# Patient Record
(demographics unavailable — no encounter records)

---

## 2017-10-31 NOTE — C.PDOC
History Of Present Illness


33 y/o female c/o lower back pain, body aches, subjective fevers for 1 day. 

Patient unsure if she is pregnant. Denies cough, nausea, vomiting, diarrhea, or 

other associated symptoms. 


Time Seen by Provider: 10/31/17 11:09


Chief Complaint (Nursing): Back Pain


History Per: Patient


History/Exam Limitations: no limitations


Onset/Duration Of Symptoms: Days


Current Symptoms Are (Timing): Still Present


Quality Of Discomfort: "Pain"


Previous Symptoms: None


Recent travel outside of the [place]: No





Past Medical History


Reviewed: Historical Data, Nursing Documentation, Vital Signs


Vital Signs: 


 Last Vital Signs











Temp  97.4 F L  10/31/17 11:01


 


Pulse  85   10/31/17 12:22


 


Resp  18   10/31/17 12:22


 


BP  132/75   10/31/17 12:22


 


Pulse Ox  98   10/31/17 12:22














- Medical History


PMH: No Chronic Diseases


Family History: States: Unknown Family Hx





- Social History


Hx Alcohol Use: No


Hx Substance Use: No





Review Of Systems


Except As Marked, All Systems Reviewed And Found Negative.


Constitutional: Positive for: Fever (subjective), Malaise


Cardiovascular: Negative for: Chest Pain


Respiratory: Negative for: Cough, Shortness of Breath


Gastrointestinal: Negative for: Nausea, Vomiting, Abdominal Pain


Musculoskeletal: Positive for: Back Pain


Skin: Negative for: Rash


Neurological: Negative for: Headache, Dizziness





Physical Exam





- Physical Exam


Appears: Non-toxic, Other (obsese)


Skin: Normal Color, Warm, Dry


Head: Atraumatic, Normacephalic


Oral Mucosa: Moist


Chest: Symmetrical


Cardiovascular: Rhythm Regular, No Murmur


Respiratory: Normal Breath Sounds, No Rales, No Rhonchi, No Wheezing


Gastrointestinal/Abdominal: Soft, No Tenderness, No Guarding, No Rebound


Back: Normal Inspection, No Vertebral Tenderness, No Paraspinal Tenderness


Extremity: Normal ROM, Capillary Refill (< 2 sec.)


Neurological/Psych: Oriented x3, Normal Speech, Normal Cognition





ED Course And Treatment





- Laboratory Results


Lab Interpretation: Normal (ua neg.)


Urine Pregnancy POC: Negative


O2 Sat by Pulse Oximetry: 100 (RA)


Pulse Ox Interpretation: Normal


Progress Note: tylenol PO


Reevaluation Time: 12:18


Reassessment Condition: Improved





Medical Decision Making


Medical Decision Making: 





early viral syndrome, body aches, UA/preg neg.





Disposition


Doctor Will See Patient In The: Office


Counseled Patient/Family Regarding: Studies Performed, Diagnosis





- Disposition


Referrals: 


[facility] [Outside]


[facility] [Outside]


Disposition: HOME/ ROUTINE


Disposition Time: 12:18


Condition: GOOD


Additional Instructions: 


Sigue Tylenol/Ibuprofeno para los taz del cuerpo





Examenes de la orina son NEGATIVOS de infeccion y embarraso.





Sigue en [facility].


Instructions:  Viral Syndrome (ED)


Forms:  [facility] (English)


Print Language: Afghan





- Clinical Impression


Clinical Impression: 


 Body aches








- Scribe Statement


The provider has reviewed the documentation as recorded by the Scribe





SM





All medical record entries made by the Scribe were at my direction and 

personally dictated by me. I have reviewed the chart and agree that the record 

accurately reflects my personal performance of the history, physical exam, 

medical decision making, and the department course for this patient. I have 

also personally directed, reviewed, and agree with the discharge instructions 

and disposition.